# Patient Record
Sex: FEMALE | Race: WHITE | NOT HISPANIC OR LATINO | URBAN - METROPOLITAN AREA
[De-identification: names, ages, dates, MRNs, and addresses within clinical notes are randomized per-mention and may not be internally consistent; named-entity substitution may affect disease eponyms.]

---

## 2024-06-14 ENCOUNTER — TELEMEDICINE (OUTPATIENT)
Dept: PRIMARY CARE | Facility: CLINIC | Age: 54
End: 2024-06-14
Payer: COMMERCIAL

## 2024-06-14 DIAGNOSIS — J06.9 UPPER RESPIRATORY TRACT INFECTION, UNSPECIFIED TYPE: Primary | ICD-10-CM

## 2024-06-14 PROBLEM — E83.19 IRON OVERLOAD: Status: ACTIVE | Noted: 2024-06-14

## 2024-06-14 PROBLEM — I83.819 VARICOSE VEINS WITH PAIN: Status: ACTIVE | Noted: 2024-06-14

## 2024-06-14 PROBLEM — N60.12: Status: ACTIVE | Noted: 2024-06-14

## 2024-06-14 PROBLEM — N95.1 PERIMENOPAUSE: Status: ACTIVE | Noted: 2024-06-14

## 2024-06-14 PROCEDURE — 1036F TOBACCO NON-USER: CPT | Performed by: FAMILY MEDICINE

## 2024-06-14 PROCEDURE — 99213 OFFICE O/P EST LOW 20 MIN: CPT | Performed by: FAMILY MEDICINE

## 2024-06-14 RX ORDER — FLUTICASONE PROPIONATE 50 MCG
1 SPRAY, SUSPENSION (ML) NASAL DAILY
Qty: 16 G | Refills: 0 | Status: SHIPPED | OUTPATIENT
Start: 2024-06-14 | End: 2025-06-14

## 2024-06-14 RX ORDER — ZINC/VIT C/PYRIDOXINE (VIT B6) 12-60-0.5
LOZENGE ORAL
COMMUNITY

## 2024-06-14 RX ORDER — PERPHENAZINE 4 MG
TABLET ORAL
COMMUNITY

## 2024-06-14 RX ORDER — ASCORBIC ACID 500 MG
500 TABLET ORAL DAILY
COMMUNITY

## 2024-06-14 ASSESSMENT — ENCOUNTER SYMPTOMS
SHORTNESS OF BREATH: 0
DIARRHEA: 0
ARTHRALGIAS: 0
SINUS PRESSURE: 0
LIGHT-HEADEDNESS: 0
ABDOMINAL DISTENTION: 0
FATIGUE: 1
COUGH: 1
NAUSEA: 0
DIFFICULTY URINATING: 0
SORE THROAT: 1
FEVER: 0
HEADACHES: 0
NERVOUS/ANXIOUS: 0
APPETITE CHANGE: 0
SLEEP DISTURBANCE: 0
RHINORRHEA: 1
ADENOPATHY: 0
CHEST TIGHTNESS: 0
WHEEZING: 0
BRUISES/BLEEDS EASILY: 0
MYALGIAS: 0
ABDOMINAL PAIN: 0
DYSPHORIC MOOD: 0
CHILLS: 0
DYSURIA: 0

## 2024-06-14 NOTE — PROGRESS NOTES
Subjective   Patient ID: Anjum AVILES is a 54 y.o. female who presents for Cough.  Video virtual visit. Consent obtained.     Pt has HA, nasal congestion, runny nose sneezing, and ST . Has had subjective fever and chills. No sick contacts. Onset was 1.5 weeks ago.    Pt reports symptoms are worsening.   Pt has not tried anything for tx.     Cough  Associated symptoms include rhinorrhea and a sore throat. Pertinent negatives include no chest pain, chills, ear pain, fever, headaches, myalgias, rash, shortness of breath or wheezing.       Review of Systems   Constitutional:  Positive for fatigue. Negative for appetite change, chills and fever.   HENT:  Positive for congestion, rhinorrhea and sore throat. Negative for ear pain and sinus pressure.    Eyes:  Negative for visual disturbance.   Respiratory:  Positive for cough. Negative for chest tightness, shortness of breath and wheezing.    Cardiovascular:  Negative for chest pain.   Gastrointestinal:  Negative for abdominal distention, abdominal pain, diarrhea and nausea.   Genitourinary:  Negative for difficulty urinating, dysuria and pelvic pain.   Musculoskeletal:  Negative for arthralgias and myalgias.   Skin:  Negative for rash.   Allergic/Immunologic: Negative for immunocompromised state.   Neurological:  Negative for light-headedness and headaches.   Hematological:  Negative for adenopathy. Does not bruise/bleed easily.   Psychiatric/Behavioral:  Negative for dysphoric mood and sleep disturbance. The patient is not nervous/anxious.        Objective   There were no vitals taken for this visit.   Physical Exam  Constitutional:       General: She is not in acute distress.     Appearance: Normal appearance.   Pulmonary:      Effort: Pulmonary effort is normal.   Neurological:      Mental Status: She is alert.   Psychiatric:         Mood and Affect: Mood normal.         Behavior: Behavior normal.           Assessment/Plan   Diagnoses and all orders for this  visit:  Upper respiratory tract infection - Recommend rest, increased fluids, nasal saline at least 3x daily (Ie. Sinus Rinse), salt water gargles, and Tylenol as needed. Sending Rx for Fluticasone.   Follow up in office in not improving by 2-3 days.

## 2024-06-14 NOTE — PROGRESS NOTES
Subjective   Patient ID: Anjum AVILES is a 54 y.o. female who presents for Cough.    HPI     Review of Systems    Objective   There were no vitals taken for this visit.    Physical Exam    Assessment/Plan

## 2024-08-12 ENCOUNTER — APPOINTMENT (OUTPATIENT)
Dept: OPHTHALMOLOGY | Facility: CLINIC | Age: 54
End: 2024-08-12
Payer: COMMERCIAL